# Patient Record
Sex: MALE | Race: WHITE | NOT HISPANIC OR LATINO | Employment: UNEMPLOYED | URBAN - METROPOLITAN AREA
[De-identification: names, ages, dates, MRNs, and addresses within clinical notes are randomized per-mention and may not be internally consistent; named-entity substitution may affect disease eponyms.]

---

## 2020-09-04 ENCOUNTER — OFFICE VISIT (OUTPATIENT)
Dept: URGENT CARE | Facility: CLINIC | Age: 55
End: 2020-09-04

## 2020-09-04 VITALS
RESPIRATION RATE: 20 BRPM | SYSTOLIC BLOOD PRESSURE: 108 MMHG | BODY MASS INDEX: 27.47 KG/M2 | HEIGHT: 67 IN | TEMPERATURE: 97 F | WEIGHT: 175 LBS | OXYGEN SATURATION: 96 % | DIASTOLIC BLOOD PRESSURE: 70 MMHG | HEART RATE: 86 BPM

## 2020-09-04 DIAGNOSIS — H61.23 BILATERAL IMPACTED CERUMEN: Primary | ICD-10-CM

## 2020-09-04 PROCEDURE — 99203 PR OFFICE/OUTPT VISIT, NEW, LEVL III, 30-44 MIN: ICD-10-PCS | Mod: TIER,S$GLB,, | Performed by: FAMILY MEDICINE

## 2020-09-04 PROCEDURE — 99203 OFFICE O/P NEW LOW 30 MIN: CPT | Mod: TIER,S$GLB,, | Performed by: FAMILY MEDICINE

## 2020-09-04 RX ORDER — ATORVASTATIN CALCIUM 10 MG/1
10 TABLET, FILM COATED ORAL
COMMUNITY

## 2020-09-04 RX ORDER — CETIRIZINE HYDROCHLORIDE 10 MG/1
10 TABLET ORAL DAILY
COMMUNITY

## 2020-09-04 RX ORDER — NEOMYCIN SULFATE, POLYMYXIN B SULFATE, HYDROCORTISONE 3.5; 10000; 1 MG/ML; [USP'U]/ML; MG/ML
3 SOLUTION/ DROPS AURICULAR (OTIC) 3 TIMES DAILY
Qty: 10 ML | Refills: 0 | Status: SHIPPED | OUTPATIENT
Start: 2020-09-04 | End: 2020-09-09

## 2020-09-04 RX ORDER — NEOMYCIN SULFATE, POLYMYXIN B SULFATE, HYDROCORTISONE 3.5; 10000; 1 MG/ML; [USP'U]/ML; MG/ML
3 SOLUTION/ DROPS AURICULAR (OTIC) 3 TIMES DAILY
Qty: 10 ML | Refills: 0 | Status: SHIPPED | OUTPATIENT
Start: 2020-09-04 | End: 2020-09-04 | Stop reason: SDUPTHER

## 2020-09-04 NOTE — PATIENT INSTRUCTIONS
Earwax (Treated)    Everyone produces earwax from the lining of the ear canal. It lubricates and protects the ear. The wax that forms in the canal slowly moves toward the outside of the ear and falls out. Sometimes wax can build up in the ear canal. This can cause a blockage and loss of hearing. A buildup of earwax was removed from your ear today.  Home care  If you have a tendency to build up wax in the ear canal, you should clear the wax at home regularly, before it causes discomfort. This should be about once every six months.  · Unless a medicine was prescribed, you may use an over-the-counter product made for clearing earwax. These contain carbamide peroxide and are available over-the-counter in a kit with a small bulb syringe.  · Lie down with the blocked ear facing upward. Apply one dropper full of medicine and wait a few minutes. Grasp the outer ear and wiggle it to help the solution enter the canal.  · Lean over a sink or basin with the blocked ear turned downward. Use a rubber bulb syringe filled with warm (not hot or cold) water to rinse the ear several times. Use gentle pressure only. You may need to repeat the irrigation several times before the wax flows out.  · If you are having trouble draining all the water out of your ear canal, put a few drops of rubbing alcohol into the ear canal. This will help remove the remaining water.  Don'ts  · Dont use cold water to rinse the ear. This will make you dizzy.  · Dont do this procedure if you have an ear infection. Symptoms include ear pain, fever, or fluid draining from the ear.  · Dont do this procedure if you have a punctured eardrum.  · Dont use cotton swabs, matches, hairpins, keys, or other objects to clean the ear canal. This can cause infection of the ear canal or rupture of the eardrum. Because of their size and shape, cotton swabs can push the earwax deeper into the ear canal instead of removing it.  Follow-up care  Follow up with your  healthcare provider, or as advised.  When to seek medical advice  Call your healthcare provider right away if any of these occur:  · Worsening ear pain  · Fever of 100.4°F (38°C) or higher, or as directed by your healthcare provider  · Hearing does not return to normal after three days of treatment  · Fluid drainage or bleeding from the ear canal  · Swelling, redness, or tenderness of the outer ear  · Headache, neck pain, or stiff neck  Date Last Reviewed: 3/22/2015  © 5402-2984 [employer]. [address] [place] [address]. All rights reserved. This information is not intended as a substitute for professional medical care. Always follow your healthcare professional's instructions.

## 2020-09-04 NOTE — PROGRESS NOTES
"Subjective:       Patient ID: Armin Guevara is a 55 y.o. male.    Vitals:  height is 5' 7" (1.702 m) and weight is 79.4 kg (175 lb). His temperature is 97 °F (36.1 °C). His blood pressure is 108/70 and his pulse is 86. His respiration is 20 and oxygen saturation is 96%.     Chief Complaint: Otalgia (left ear)    54 yo male presents today with chief complaint of left otalgia for 3 days. Pt describes the left ear as being clogged severely affecting his hearing. Pt reports starting a Z pack last night. Pt took the first 2 pills. Pt put hydrogen peroxide in his ear, Debrox earwax removal, white vinegar + rubbing alcohol. Pt states that the Debrox was the only treatment that made his ear pop slightly before being clogged again.     Otalgia   There is pain in the left ear. This is a new problem. The current episode started in the past 7 days. The problem occurs constantly. The problem has been unchanged. There has been no fever. The pain is at a severity of 2/10. The pain is mild. Associated symptoms include hearing loss. Pertinent negatives include no coughing, rash, sore throat or vomiting. He has tried ear drops (flushes; Debrox) for the symptoms. The treatment provided no relief.       Constitution: Negative for chills, sweating, fatigue and fever.   HENT: Positive for ear pain and hearing loss. Negative for congestion, sinus pain, sinus pressure, sore throat and voice change.    Neck: Negative for painful lymph nodes.   Eyes: Negative for eye redness.   Respiratory: Negative for chest tightness, cough, sputum production, bloody sputum, COPD, shortness of breath, stridor, wheezing and asthma.    Gastrointestinal: Negative for nausea and vomiting.   Musculoskeletal: Negative for muscle ache.   Skin: Negative for rash.   Allergic/Immunologic: Negative for seasonal allergies and asthma.   Hematologic/Lymphatic: Negative for swollen lymph nodes.       Objective:      Physical Exam   Constitutional: No distress. " normal  HENT:   Head: Normocephalic and atraumatic.   Ears:   Right Ear: Hearing and tympanic membrane normal. There is cerumen present. Right ear decreased TM mobility: irrigated to clear bilaterally, irritation to canal noted.   Left Ear: Hearing and tympanic membrane normal. There is cerumen present.   Abdominal: Normal appearance.   Neurological: He is alert.         Assessment:       1. Bilateral impacted cerumen        Plan:         Bilateral impacted cerumen  -     Remove impacted ear wax  -     neomycin-polymyxin-hydrocortisone (CORTISPORIN) otic solution; Place 3 drops into both ears 3 (three) times daily. for 5 days  Dispense: 10 mL; Refill: 0    RTC prn

## 2023-12-06 ENCOUNTER — TELEPHONE (OUTPATIENT)
Dept: PAIN MEDICINE | Facility: CLINIC | Age: 58
End: 2023-12-06

## 2023-12-06 NOTE — TELEPHONE ENCOUNTER
----- Message from Rachel Pina sent at 12/6/2023 11:23 AM CST -----  Regarding: appt access/ pt all  Name of Caller: CURRY SEO [[id number]]      When is the first available appointment? cannot schedule       Symptoms:       Best Call Back Number: [phone]        Additional Information: pt needs a call back, is trying to get an appt, according to his insurance provider is also a family medicine dr, pt would like a call back to discuss,  please advise.

## 2023-12-06 NOTE — TELEPHONE ENCOUNTER
Staff contacted pt who was looking to switch over his care from [facility]. After discussing it was determine that pt needs pcp and not pain management

## 2024-03-27 DIAGNOSIS — J45.909 UNSPECIFIED ASTHMA, UNCOMPLICATED: Primary | ICD-10-CM

## 2024-04-03 ENCOUNTER — HOSPITAL ENCOUNTER (OUTPATIENT)
Dept: PULMONOLOGY | Facility: HOSPITAL | Age: 59
Discharge: HOME | End: 2024-04-03
Attending: NURSE PRACTITIONER
Payer: MEDICAID

## 2024-04-03 DIAGNOSIS — J45.909 UNSPECIFIED ASTHMA, UNCOMPLICATED: ICD-10-CM

## 2024-04-03 PROCEDURE — 94010 BREATHING CAPACITY TEST: CPT | Mod: XB

## 2024-04-03 PROCEDURE — 94729 DIFFUSING CAPACITY: CPT

## 2024-04-03 PROCEDURE — 94060 EVALUATION OF WHEEZING: CPT

## 2024-04-03 PROCEDURE — 94727 GAS DIL/WSHOT DETER LNG VOL: CPT

## 2024-06-18 ENCOUNTER — OFFICE VISIT (OUTPATIENT)
Dept: PRIMARY CARE CLINIC | Facility: CLINIC | Age: 59
End: 2024-06-18
Payer: MEDICAID

## 2024-06-18 VITALS
BODY MASS INDEX: 27.75 KG/M2 | TEMPERATURE: 98 F | OXYGEN SATURATION: 98 % | SYSTOLIC BLOOD PRESSURE: 114 MMHG | HEIGHT: 67 IN | WEIGHT: 176.81 LBS | HEART RATE: 65 BPM | DIASTOLIC BLOOD PRESSURE: 72 MMHG

## 2024-06-18 DIAGNOSIS — R73.03 PREDIABETES: ICD-10-CM

## 2024-06-18 DIAGNOSIS — E78.1 HYPERTRIGLYCERIDEMIA: Primary | ICD-10-CM

## 2024-06-18 PROBLEM — E78.5 HYPERLIPIDEMIA: Status: ACTIVE | Noted: 2023-12-12

## 2024-06-18 PROBLEM — J30.2 SEASONAL ALLERGIC RHINITIS: Status: ACTIVE | Noted: 2023-12-12

## 2024-06-18 PROBLEM — J45.909 ASTHMA: Status: ACTIVE | Noted: 2023-12-12

## 2024-06-18 PROBLEM — G47.33 OBSTRUCTIVE SLEEP APNEA OF ADULT: Status: ACTIVE | Noted: 2023-12-12

## 2024-06-18 PROBLEM — F41.9 ANXIETY: Status: ACTIVE | Noted: 2023-12-12

## 2024-06-18 PROBLEM — N40.0 BENIGN PROSTATIC HYPERPLASIA: Status: ACTIVE | Noted: 2023-12-26

## 2024-06-18 PROCEDURE — 3008F BODY MASS INDEX DOCD: CPT | Mod: CPTII,,, | Performed by: STUDENT IN AN ORGANIZED HEALTH CARE EDUCATION/TRAINING PROGRAM

## 2024-06-18 PROCEDURE — 99204 OFFICE O/P NEW MOD 45 MIN: CPT | Mod: S$PBB,,, | Performed by: STUDENT IN AN ORGANIZED HEALTH CARE EDUCATION/TRAINING PROGRAM

## 2024-06-18 PROCEDURE — 99214 OFFICE O/P EST MOD 30 MIN: CPT | Mod: PBBFAC,PN | Performed by: STUDENT IN AN ORGANIZED HEALTH CARE EDUCATION/TRAINING PROGRAM

## 2024-06-18 PROCEDURE — 3074F SYST BP LT 130 MM HG: CPT | Mod: CPTII,,, | Performed by: STUDENT IN AN ORGANIZED HEALTH CARE EDUCATION/TRAINING PROGRAM

## 2024-06-18 PROCEDURE — 1159F MED LIST DOCD IN RCRD: CPT | Mod: CPTII,,, | Performed by: STUDENT IN AN ORGANIZED HEALTH CARE EDUCATION/TRAINING PROGRAM

## 2024-06-18 PROCEDURE — 3078F DIAST BP <80 MM HG: CPT | Mod: CPTII,,, | Performed by: STUDENT IN AN ORGANIZED HEALTH CARE EDUCATION/TRAINING PROGRAM

## 2024-06-18 PROCEDURE — 99999 PR PBB SHADOW E&M-EST. PATIENT-LVL IV: CPT | Mod: PBBFAC,,, | Performed by: STUDENT IN AN ORGANIZED HEALTH CARE EDUCATION/TRAINING PROGRAM

## 2024-06-18 RX ORDER — BUDESONIDE AND FORMOTEROL FUMARATE DIHYDRATE 160; 4.5 UG/1; UG/1
2 AEROSOL RESPIRATORY (INHALATION) 2 TIMES DAILY
COMMUNITY
Start: 2024-03-27

## 2024-06-18 RX ORDER — FINASTERIDE 1 MG/1
TABLET, FILM COATED ORAL
COMMUNITY
Start: 2024-06-11

## 2024-06-18 RX ORDER — ATORVASTATIN CALCIUM 40 MG/1
1 TABLET, FILM COATED ORAL DAILY
COMMUNITY
Start: 2024-03-27

## 2024-06-18 RX ORDER — BUSPIRONE HYDROCHLORIDE 15 MG/1
15 TABLET ORAL 3 TIMES DAILY
COMMUNITY

## 2024-06-18 NOTE — PROGRESS NOTES
06/18/2024    Armin Vizcaino  [id number]    Chief Complaint   Patient presents with    Annual Exam    Establish Care       HPI    This pt is new to me and presents to [facility] care. Chronic conditions: remote hx of etoh abuse, sober 25 years, hx of italia fundoplication, prediabetes, HLD. Patient has no acute concerns today. He has been closely monitoring his lipid panel and hgba1c every few months to keep it in good control. Recent hgba1c increased from 5.7 to 5.9 and wants to know what adjustments he could make. Describes already eating a  very low carb diet and getting routine exercise in the form of cardio.                                                  Negative 10 point ROS outside of HPI    Social History     Socioeconomic History    Marital status: Single   Tobacco Use    Smoking status: Never    Smokeless tobacco: Never   Substance and Sexual Activity    Alcohol use: Never    Drug use: Never    Sexual activity: Yes           Current Outpatient Medications:     atorvastatin (LIPITOR) 40 MG tablet, Take 1 tablet by mouth once daily., Disp: , Rfl:     budesonide-formoterol 160-4.5 mcg (SYMBICORT) 160-4.5 mcg/actuation HFAA, Inhale 2 puffs into the lungs 2 (two) times daily., Disp: , Rfl:     busPIRone (BUSPAR) 15 MG tablet, Take 15 mg by mouth 3 (three) times daily., Disp: , Rfl:     cetirizine (ZYRTEC) 10 MG tablet, Take 10 mg by mouth once daily., Disp: , Rfl:     finasteride (PROPECIA) 1 mg tablet, Take 1 tablet every day by oral route for 90 days., Disp: , Rfl:       Physical Exam  Vitals:    06/18/24 1028   BP: 114/72   Pulse: 65   Temp: 98 °F (36.7 °C)       Physical Exam      Gen: well appearing, NAD  Resp: non labored breathing, no crackles, no wheezes, CTAB  CV: RRR no murmur, gallops, rubs, no LE edema  Abd: soft nontender BS present no organomegaly      1. Hypertriglyceridemia  - Lipid Panel; Future    2. Prediabetes  - Hemoglobin A1C; Future    Will repeat labs in 3 months at patient request.        Tereza Alex MD  Family Medicine

## 2024-06-24 NOTE — TELEPHONE ENCOUNTER
No care due was identified.  [facility] Embedded Care Due Messages. Reference number: [id number].   6/24/2024 5:11:03 PM CDT

## 2024-06-24 NOTE — TELEPHONE ENCOUNTER
----- Message from Giovana Dias MA sent at 6/24/2024  3:56 PM CDT -----  Contact: Pt [phone]    ----- Message -----  From: Areli Lindo  Sent: 6/24/2024  12:50 PM CDT  To: Abi Starks Staff    Requesting an RX refill or new RX.    Is this a refill or new RX: Refill    RX name and strength (copy/paste from chart):  atorvastatin (LIPITOR) 40 MG tablet    Is this a 30 day or 90 day RX: 90    Pharmacy name and phone # (copy/paste from chart):    [id number] [place] - [address]  [address] [address]  Phone: [phone] Fax: [phone]    Please call and advise.    Thank You

## 2024-06-25 RX ORDER — ATORVASTATIN CALCIUM 40 MG/1
40 TABLET, FILM COATED ORAL DAILY
Qty: 90 TABLET | Refills: 3 | Status: SHIPPED | OUTPATIENT
Start: 2024-07-01

## 2024-06-25 NOTE — TELEPHONE ENCOUNTER
----- Message from Essie Joaquín sent at 6/25/2024  1:22 PM CDT -----  Contact: [phone]  Pt is calling to status update on prescription refill for atorvastatin (LIPITOR) 40 MG tablet.    Pt covered come July 1st so he needs this medication refilled    Requesting an RX refill or new RX.    Is this a refill or new RX: refill    RX name and strength (copy/paste from chart):  [phone]    Is this a 30 day or 90 day RX: 90    Pharmacy name and phone # (copy/paste from chart):    [id number] [place] - [address]  [address] [address]  Phone: [phone] Fax: [phone]      Please call pt and advise

## 2024-09-19 DIAGNOSIS — R73.03 PREDIABETES: ICD-10-CM

## 2024-09-20 ENCOUNTER — TELEPHONE (OUTPATIENT)
Dept: PRIMARY CARE CLINIC | Facility: CLINIC | Age: 59
End: 2024-09-20
Payer: MEDICAID

## 2024-09-20 DIAGNOSIS — Z00.00 ROUTINE HEALTH MAINTENANCE: Primary | ICD-10-CM

## 2024-09-20 NOTE — TELEPHONE ENCOUNTER
----- Message from Dorina Das sent at 9/20/2024 12:29 PM CDT -----  Contact: Pt [phone]  1MEDICALADVICE     Patient is calling for Medical Advice regarding: Pt is calling to see if the protein is included in the A1c or the lipid panel if not an order needs to be added for that. So you can schedule a lab appt    Patient wants a call back or thru [facility]: call back    Comments:    Please advise patient replies from provider may take up to 48 hours.      Please Call and advise    Thank you    Please do NOT rep[ly to sender as this is from the call center and they answer incoming calls only.

## 2024-10-08 NOTE — TELEPHONE ENCOUNTER
Care Due:                  Date            Visit Type   Department     Provider  --------------------------------------------------------------------------------                                NP -                              PRIMARY      [facility] PRIMARY  Last Visit: 06-      CARE ([facility])   YOVANA Alex  Next Visit: None Scheduled  None         None Found                                                            Last  Test          Frequency    Reason                     Performed    Due Date  --------------------------------------------------------------------------------    CMP.........  12 months..  atorvastatin.............  Not Found    Overdue    Lipid Panel.  12 months..  atorvastatin.............  Not Found    Overdue    [employer] Embedded Care Due Messages. Reference number: [id number].   10/08/2024 12:45:29 PM CDT

## 2024-10-10 RX ORDER — BUSPIRONE HYDROCHLORIDE 15 MG/1
15 TABLET ORAL 3 TIMES DAILY
Qty: 90 TABLET | Refills: 3 | Status: SHIPPED | OUTPATIENT
Start: 2024-10-10

## 2024-12-09 ENCOUNTER — TELEPHONE (OUTPATIENT)
Dept: PRIMARY CARE CLINIC | Facility: CLINIC | Age: 59
End: 2024-12-09

## 2024-12-09 DIAGNOSIS — Z23 NEEDS FLU SHOT: Primary | ICD-10-CM

## 2024-12-09 NOTE — TELEPHONE ENCOUNTER
Spoke with patient. His labs were scheduled incorrectly and he was given the wrong address for them to be completed.     He is scheduled and I will follow up to ensure phone teams knows.

## 2024-12-13 ENCOUNTER — LAB VISIT (OUTPATIENT)
Dept: LAB | Facility: HOSPITAL | Age: 59
End: 2024-12-13
Attending: STUDENT IN AN ORGANIZED HEALTH CARE EDUCATION/TRAINING PROGRAM
Payer: MEDICAID

## 2024-12-13 DIAGNOSIS — E78.1 HYPERTRIGLYCERIDEMIA: ICD-10-CM

## 2024-12-13 DIAGNOSIS — R73.03 PREDIABETES: ICD-10-CM

## 2024-12-13 DIAGNOSIS — Z00.00 ROUTINE HEALTH MAINTENANCE: ICD-10-CM

## 2024-12-13 LAB
ALBUMIN SERPL BCP-MCNC: 4.2 G/DL (ref 3.5–5.2)
ALP SERPL-CCNC: 43 U/L (ref 55–135)
ALT SERPL W/O P-5'-P-CCNC: 31 U/L (ref 10–44)
ANION GAP SERPL CALC-SCNC: 5 MMOL/L (ref 8–16)
AST SERPL-CCNC: 22 U/L (ref 10–40)
BILIRUB SERPL-MCNC: 1 MG/DL (ref 0.1–1)
BUN SERPL-MCNC: 20 MG/DL (ref 6–20)
CALCIUM SERPL-MCNC: 9.3 MG/DL (ref 8.7–10.5)
CHLORIDE SERPL-SCNC: 106 MMOL/L (ref 95–110)
CHOLEST SERPL-MCNC: 150 MG/DL (ref 120–199)
CHOLEST/HDLC SERPL: 4.7 {RATIO} (ref 2–5)
CO2 SERPL-SCNC: 30 MMOL/L (ref 23–29)
CREAT SERPL-MCNC: 1.3 MG/DL (ref 0.5–1.4)
EST. GFR  (NO RACE VARIABLE): >60 ML/MIN/1.73 M^2
ESTIMATED AVG GLUCOSE: 120 MG/DL (ref 68–131)
GLUCOSE SERPL-MCNC: 98 MG/DL (ref 70–110)
HBA1C MFR BLD: 5.8 % (ref 4.5–6.2)
HDLC SERPL-MCNC: 32 MG/DL (ref 40–75)
HDLC SERPL: 21.3 % (ref 20–50)
LDLC SERPL CALC-MCNC: 74 MG/DL (ref 63–159)
NONHDLC SERPL-MCNC: 118 MG/DL
POTASSIUM SERPL-SCNC: 3.7 MMOL/L (ref 3.5–5.1)
PROT SERPL-MCNC: 6.3 G/DL (ref 6–8.4)
SODIUM SERPL-SCNC: 141 MMOL/L (ref 136–145)
TRIGL SERPL-MCNC: 220 MG/DL (ref 30–150)

## 2024-12-13 PROCEDURE — 80053 COMPREHEN METABOLIC PANEL: CPT | Performed by: STUDENT IN AN ORGANIZED HEALTH CARE EDUCATION/TRAINING PROGRAM

## 2024-12-13 PROCEDURE — 80061 LIPID PANEL: CPT | Performed by: STUDENT IN AN ORGANIZED HEALTH CARE EDUCATION/TRAINING PROGRAM

## 2024-12-13 PROCEDURE — 36415 COLL VENOUS BLD VENIPUNCTURE: CPT | Performed by: STUDENT IN AN ORGANIZED HEALTH CARE EDUCATION/TRAINING PROGRAM

## 2024-12-13 PROCEDURE — 83036 HEMOGLOBIN GLYCOSYLATED A1C: CPT | Performed by: STUDENT IN AN ORGANIZED HEALTH CARE EDUCATION/TRAINING PROGRAM

## 2025-02-05 RX ORDER — BUSPIRONE HYDROCHLORIDE 15 MG/1
15 TABLET ORAL 3 TIMES DAILY
Qty: 90 TABLET | Refills: 3 | Status: SHIPPED | OUTPATIENT
Start: 2025-02-05

## 2025-02-05 NOTE — TELEPHONE ENCOUNTER
No care due was identified.  [facility] Embedded Care Due Messages. Reference number: [id number].   2/05/2025 5:46:44 AM CST

## 2025-03-03 ENCOUNTER — OFFICE VISIT (OUTPATIENT)
Dept: PRIMARY CARE CLINIC | Facility: CLINIC | Age: 60
End: 2025-03-03
Payer: MEDICAID

## 2025-03-03 VITALS
DIASTOLIC BLOOD PRESSURE: 66 MMHG | BODY MASS INDEX: 27.35 KG/M2 | HEART RATE: 86 BPM | HEIGHT: 67 IN | WEIGHT: 174.25 LBS | SYSTOLIC BLOOD PRESSURE: 110 MMHG | OXYGEN SATURATION: 96 %

## 2025-03-03 DIAGNOSIS — G47.33 OBSTRUCTIVE SLEEP APNEA (ADULT) (PEDIATRIC): ICD-10-CM

## 2025-03-03 DIAGNOSIS — J45.909 ASTHMA, UNSPECIFIED ASTHMA SEVERITY, UNSPECIFIED WHETHER COMPLICATED, UNSPECIFIED WHETHER PERSISTENT: ICD-10-CM

## 2025-03-03 DIAGNOSIS — J32.9 SINUSITIS, UNSPECIFIED CHRONICITY, UNSPECIFIED LOCATION: Primary | ICD-10-CM

## 2025-03-03 PROCEDURE — 3078F DIAST BP <80 MM HG: CPT | Mod: CPTII,,, | Performed by: FAMILY MEDICINE

## 2025-03-03 PROCEDURE — 3074F SYST BP LT 130 MM HG: CPT | Mod: CPTII,,, | Performed by: FAMILY MEDICINE

## 2025-03-03 PROCEDURE — 99213 OFFICE O/P EST LOW 20 MIN: CPT | Mod: PBBFAC,PN | Performed by: FAMILY MEDICINE

## 2025-03-03 PROCEDURE — 99999 PR PBB SHADOW E&M-EST. PATIENT-LVL III: CPT | Mod: PBBFAC,,, | Performed by: FAMILY MEDICINE

## 2025-03-03 PROCEDURE — 1159F MED LIST DOCD IN RCRD: CPT | Mod: CPTII,,, | Performed by: FAMILY MEDICINE

## 2025-03-03 PROCEDURE — 3008F BODY MASS INDEX DOCD: CPT | Mod: CPTII,,, | Performed by: FAMILY MEDICINE

## 2025-03-03 PROCEDURE — 99214 OFFICE O/P EST MOD 30 MIN: CPT | Mod: S$PBB,,, | Performed by: FAMILY MEDICINE

## 2025-03-03 RX ORDER — AZELASTINE 1 MG/ML
2 SPRAY, METERED NASAL 2 TIMES DAILY
Qty: 30 ML | Refills: 1 | Status: SHIPPED | OUTPATIENT
Start: 2025-03-03 | End: 2026-03-03

## 2025-03-03 RX ORDER — BUDESONIDE AND FORMOTEROL FUMARATE DIHYDRATE 160; 4.5 UG/1; UG/1
2 AEROSOL RESPIRATORY (INHALATION) 2 TIMES DAILY
Qty: 10.2 G | Refills: 3 | Status: SHIPPED | OUTPATIENT
Start: 2025-03-03

## 2025-03-03 RX ORDER — AMOXICILLIN AND CLAVULANATE POTASSIUM 875; 125 MG/1; MG/1
1 TABLET, FILM COATED ORAL EVERY 12 HOURS
Qty: 10 TABLET | Refills: 0 | Status: SHIPPED | OUTPATIENT
Start: 2025-03-03 | End: 2025-03-08

## 2025-03-03 NOTE — PROGRESS NOTES
Clinic Note  3/5/2025      Subjective:       Patient ID:  Armin is a 60 y.o. male being seen for an established visit.    Chief Complaint: Sinus Problem    SINUSITIS  Pt states they have had a sinus infection for 1.5 weeks. He suspects the reason might be due to his dust allergy and that he hasn't been dusting his house as often as he should. He reports symptoms of congestion, rhinorrhea, facial pressure and pain behind eyes. In the last 5 days he's had intense episodes of coughing and sneezing that worsens at night. Twice since these episodes have started he experienced a shooting pain going from the front to the back of his head. He noticed this morning his mucus went from yellow to dark green. To manage symptoms, he  says he mixes hot water, saline packets, and baking soda together and snorts this which provides some relief. Prior to receiving his inhaler, he had episodes like this that would last for months. He has tried Flonase before but states it always drys his mucous membranes out.    ASTHMA  Pt has a PMH of asthma which he was diagnosed with in 2018. He currently takes Symbicort 160-4.5 mcg 2 puffs twice daily for chronic management.     OBSTRUCTIVE SLEEP APNEA  Pt requests and order be sent for a BIPAP as he feels his CPAP is not giving him restorative sleep and that his CO2 levels were still elevated at 30 despite being on CPAP.    Review of Systems   Constitutional:  Negative for chills, diaphoresis, fever, malaise/fatigue and weight loss.   HENT:  Positive for congestion, sinus pain and sore throat. Negative for ear discharge, ear pain, hearing loss, nosebleeds and tinnitus.    Eyes:  Negative for blurred vision, double vision, photophobia, pain, discharge and redness.   Respiratory:  Positive for cough and sputum production. Negative for hemoptysis, shortness of breath, wheezing and stridor.    Cardiovascular:  Negative for chest pain and palpitations.   Gastrointestinal:  Negative for  "constipation, diarrhea and vomiting.   Musculoskeletal: Negative.    Skin:  Negative for itching and rash.   Neurological: Negative.    Psychiatric/Behavioral: Negative.         Medication List with Changes/Refills   New Medications    AMOXICILLIN-CLAVULANATE 875-125MG (AUGMENTIN) 875-125 MG PER TABLET    Take 1 tablet by mouth every 12 (twelve) hours. For bacterial sinusitis for 5 days    AZELASTINE (ASTELIN) 137 MCG (0.1 %) NASAL SPRAY    2 sprays (274 mcg total) by Nasal route 2 (two) times daily.   Current Medications    ATORVASTATIN (LIPITOR) 40 MG TABLET    Take 1 tablet (40 mg total) by mouth once daily.    BUSPIRONE (BUSPAR) 15 MG TABLET    TAKE 1 TABLET BY MOUTH THREE TIMES DAILY    CETIRIZINE (ZYRTEC) 10 MG TABLET    Take 10 mg by mouth once daily.    FINASTERIDE (PROPECIA) 1 MG TABLET    Take 1 tablet every day by oral route for 90 days.   Changed and/or Refilled Medications    Modified Medication Previous Medication    BUDESONIDE-FORMOTEROL 160-4.5 MCG (SYMBICORT) 160-4.5 MCG/ACTUATION HFAA budesonide-formoterol 160-4.5 mcg (SYMBICORT) 160-4.5 mcg/actuation HFAA       Inhale 2 puffs into the lungs 2 (two) times daily.    Inhale 2 puffs into the lungs 2 (two) times daily.       Problem List[1]        Objective:      /66 (BP Location: Right arm, Patient Position: Sitting)   Pulse 86   Ht 5' 7" (1.702 m)   Wt 79.1 kg (174 lb 4.4 oz)   SpO2 96%   BMI 27.30 kg/m²   Estimated body mass index is 27.3 kg/m² as calculated from the following:    Height as of this encounter: 5' 7" (1.702 m).    Weight as of this encounter: 79.1 kg (174 lb 4.4 oz).  Physical Exam  Constitutional:       General: He is not in acute distress.     Appearance: Normal appearance. He is not ill-appearing.   HENT:      Head: Normocephalic and atraumatic.      Right Ear: Ear canal and external ear normal. There is no impacted cerumen.      Left Ear: Ear canal and external ear normal. There is no impacted cerumen.      Nose: Nose " normal.      Mouth/Throat:      Mouth: Mucous membranes are moist.      Pharynx: Oropharynx is clear. Posterior oropharyngeal erythema present.   Eyes:      Conjunctiva/sclera: Conjunctivae normal.   Cardiovascular:      Rate and Rhythm: Normal rate and regular rhythm.      Pulses: Normal pulses.      Heart sounds: Normal heart sounds.   Pulmonary:      Effort: Pulmonary effort is normal. No respiratory distress.      Breath sounds: Normal breath sounds. No stridor. No wheezing, rhonchi or rales.   Chest:      Chest wall: No tenderness.   Skin:     General: Skin is warm and dry.   Neurological:      General: No focal deficit present.      Mental Status: He is alert.   Psychiatric:         Mood and Affect: Mood normal.         Behavior: Behavior normal.         Thought Content: Thought content normal.         Judgment: Judgment normal.        Additional PE notes:   HENT  - TM was hazy with some fluid behind it but otherwise pinkish gray with no perforation or contraction.   - frontal and maxillary sinus pressure (L>R)  - tenderness of L tonsillar lymph node    Assessment and Plan:     1. Sinusitis, unspecified chronicity, unspecified location (Primary)  - recommended an OTC saline nasal rinse to rinse sinuses  - azelastine (ASTELIN) 137 mcg (0.1 %) nasal spray; 2 sprays (274 mcg total) by Nasal route 2 (two) times daily.  Dispense: 30 mL; Refill: 1  - amoxicillin-clavulanate 875-125mg (AUGMENTIN) 875-125 mg per tablet; Take 1 tablet by mouth every 12 (twelve) hours. For bacterial sinusitis for 5 days  Dispense: 10 tablet; Refill: 0    2. Asthma, unspecified asthma severity, unspecified whether complicated, unspecified whether persistent  - Asthma well controlled by Symbicort. Refill sent  - budesonide-formoterol 160-4.5 mcg (SYMBICORT) 160-4.5 mcg/actuation HFAA; Inhale 2 puffs into the lungs 2 (two) times daily.  Dispense: 10.2 g; Refill: 3    3. Obstructive sleep apnea (adult) (pediatric)  - Pt advised to follow up  with sleep medicine doctor to adjust his BIPAP settings.  - BIPAP FOR HOME USE   - will order BIPAP per pt request, however, did discuss with pt that I do not typically prescribe BIPAP for GUILLAUME and will likely need sleep medicine f/u. Will send in BIPAP order at this time faxed to [phone] to see if insurance will cover    Follow Up:   No follow-ups on file.    Other Orders Placed This Visit:  Orders Placed This Encounter   Procedures    BIPAP FOR HOME USE       I saw and evaluated the patient and discussed the care with  SHARONDA Sorenson. I agree with the findings and plan as documented in the note above.      Hira Gonzalez MD            This note is dictated on M*Modal word recognition program.  There are word recognition mistakes that are occasionally missed on review.         [1]   Patient Active Problem List  Diagnosis    Anxiety    Asthma    Benign prostatic hyperplasia    Hyperlipidemia    Inguinal hernia    Obstructive sleep apnea of adult    Seasonal allergic rhinitis

## 2025-03-31 DIAGNOSIS — E78.5 HYPERLIPIDEMIA, UNSPECIFIED HYPERLIPIDEMIA TYPE: Primary | ICD-10-CM

## 2025-03-31 NOTE — TELEPHONE ENCOUNTER
----- Message from Jessy sent at 3/31/2025  2:56 PM CDT -----  Contact: [phone]  Requesting an RX refill or new RX.Is this a refill or new RX: refillRX name and strength   finasteride (PROPECIA) 1 mg tabletIs this a 30 day or 90 day RX: 90Pharmacy name and phone #   [facility] [address] Phone: [phone]: [phone] Patient has been waiting on medication since last week.

## 2025-03-31 NOTE — TELEPHONE ENCOUNTER
No care due was identified.  [facility] Embedded Care Due Messages. Reference number: [id number].   3/31/2025 11:37:51 AM CDT

## 2025-04-01 RX ORDER — ATORVASTATIN CALCIUM 40 MG/1
40 TABLET, FILM COATED ORAL
Qty: 90 TABLET | Refills: 3 | Status: SHIPPED | OUTPATIENT
Start: 2025-04-01

## 2025-04-01 NOTE — TELEPHONE ENCOUNTER
Refill Routing Note   Medication(s) are not appropriate for processing by [facility] for the following reason(s):             [facility] action(s):  Approve               Appointments  past 12m or future 3m with PCP    Date Provider   Last Visit   6/18/2024 Tereza Alex MD   Next Visit   Visit date not found Tereza Alex MD   ED visits in past 90 days: 0        Note composed:9:56 PM 03/31/2025

## 2025-04-02 ENCOUNTER — TELEPHONE (OUTPATIENT)
Dept: PRIMARY CARE CLINIC | Facility: CLINIC | Age: 60
End: 2025-04-02
Payer: MEDICAID

## 2025-04-02 DIAGNOSIS — J45.909 ASTHMA, UNSPECIFIED ASTHMA SEVERITY, UNSPECIFIED WHETHER COMPLICATED, UNSPECIFIED WHETHER PERSISTENT: Primary | ICD-10-CM

## 2025-04-02 DIAGNOSIS — N40.0 BENIGN PROSTATIC HYPERPLASIA, UNSPECIFIED WHETHER LOWER URINARY TRACT SYMPTOMS PRESENT: ICD-10-CM

## 2025-04-02 NOTE — TELEPHONE ENCOUNTER
No care due was identified.  [facility] Embedded Care Due Messages. Reference number: [id number].   4/02/2025 12:52:20 PM CDT

## 2025-04-02 NOTE — TELEPHONE ENCOUNTER
----- Message from Essie sent at 4/2/2025 12:33 PM CDT -----  Contact: [phone]  Requesting an RX refill or new RX.Is this a refill or new RX: refillRX name and strength (copy/paste from chart):  cetirizine (ZYRTEC) 10 MG tabletIs this a 30 day or 90 day RX: Pharmacy name and phone # (copy/paste from chart):  [facility] [address]  [place] [facility] [address] [place] [address]Musjh: [phone] Fax: [phone] an RX refill or new RX.Is this a refill or new RX: refillRX name and strength (copy/paste from chart):  finasteride (PROPECIA) 1 mg tabletIs this a 30 day or 90 day RX: Pharmacy name and phone # (copy/paste from chart):  [facility] [place] [facility] [id number] [facility] [address]Lmrpu: [phone] Fax: [phone] : pt states he has called multiple times Please call pt and advise

## 2025-04-03 RX ORDER — CETIRIZINE HYDROCHLORIDE 10 MG/1
10 TABLET ORAL DAILY
Qty: 30 TABLET | Refills: 5 | Status: SHIPPED | OUTPATIENT
Start: 2025-04-03

## 2025-04-03 RX ORDER — FINASTERIDE 1 MG/1
1 TABLET, FILM COATED ORAL DAILY
Qty: 30 TABLET | Refills: 5 | Status: SHIPPED | OUTPATIENT
Start: 2025-04-03

## 2025-06-25 ENCOUNTER — PATIENT OUTREACH (OUTPATIENT)
Dept: ADMINISTRATIVE | Facility: HOSPITAL | Age: 60
End: 2025-06-25
Payer: MEDICAID